# Patient Record
Sex: MALE | Race: WHITE | Employment: UNEMPLOYED | URBAN - METROPOLITAN AREA
[De-identification: names, ages, dates, MRNs, and addresses within clinical notes are randomized per-mention and may not be internally consistent; named-entity substitution may affect disease eponyms.]

---

## 2017-10-08 ENCOUNTER — HOSPITAL ENCOUNTER (EMERGENCY)
Facility: CLINIC | Age: 37
Discharge: HOME | End: 2017-10-08
Attending: EMERGENCY MEDICINE | Admitting: EMERGENCY MEDICINE
Payer: OTHER MISCELLANEOUS

## 2017-10-08 ENCOUNTER — APPOINTMENT (OUTPATIENT)
Dept: MRI IMAGING | Facility: CLINIC | Age: 37
End: 2017-10-08
Attending: EMERGENCY MEDICINE
Payer: OTHER MISCELLANEOUS

## 2017-10-08 VITALS
TEMPERATURE: 98 F | HEART RATE: 71 BPM | DIASTOLIC BLOOD PRESSURE: 90 MMHG | SYSTOLIC BLOOD PRESSURE: 130 MMHG | OXYGEN SATURATION: 95 % | RESPIRATION RATE: 16 BRPM

## 2017-10-08 DIAGNOSIS — M54.50 ACUTE BILATERAL LOW BACK PAIN WITHOUT SCIATICA: ICD-10-CM

## 2017-10-08 PROCEDURE — 72148 MRI LUMBAR SPINE W/O DYE: CPT

## 2017-10-08 PROCEDURE — 99285 EMERGENCY DEPT VISIT HI MDM: CPT | Mod: 25

## 2017-10-08 PROCEDURE — 25000128 H RX IP 250 OP 636: Performed by: EMERGENCY MEDICINE

## 2017-10-08 PROCEDURE — 96374 THER/PROPH/DIAG INJ IV PUSH: CPT

## 2017-10-08 PROCEDURE — 25000132 ZZH RX MED GY IP 250 OP 250 PS 637: Performed by: EMERGENCY MEDICINE

## 2017-10-08 RX ORDER — HYDROMORPHONE HYDROCHLORIDE 2 MG/1
2 TABLET ORAL EVERY 4 HOURS PRN
Qty: 18 TABLET | Refills: 0 | Status: SHIPPED | OUTPATIENT
Start: 2017-10-08 | End: 2017-10-12

## 2017-10-08 RX ORDER — HYDROMORPHONE HYDROCHLORIDE 1 MG/ML
0.5 INJECTION, SOLUTION INTRAMUSCULAR; INTRAVENOUS; SUBCUTANEOUS
Status: DISCONTINUED | OUTPATIENT
Start: 2017-10-08 | End: 2017-10-08 | Stop reason: HOSPADM

## 2017-10-08 RX ORDER — CYCLOBENZAPRINE HCL 10 MG
10 TABLET ORAL ONCE
Status: COMPLETED | OUTPATIENT
Start: 2017-10-08 | End: 2017-10-08

## 2017-10-08 RX ORDER — ONDANSETRON 2 MG/ML
4 INJECTION INTRAMUSCULAR; INTRAVENOUS EVERY 30 MIN PRN
Status: DISCONTINUED | OUTPATIENT
Start: 2017-10-08 | End: 2017-10-08 | Stop reason: HOSPADM

## 2017-10-08 RX ADMIN — CYCLOBENZAPRINE HYDROCHLORIDE 10 MG: 10 TABLET, FILM COATED ORAL at 16:35

## 2017-10-08 RX ADMIN — HYDROMORPHONE HYDROCHLORIDE 0.5 MG: 1 INJECTION, SOLUTION INTRAMUSCULAR; INTRAVENOUS; SUBCUTANEOUS at 16:35

## 2017-10-08 ASSESSMENT — ENCOUNTER SYMPTOMS
CHILLS: 0
ABDOMINAL PAIN: 0
DIAPHORESIS: 0
COLOR CHANGE: 1
VOMITING: 0
NECK PAIN: 0
NAUSEA: 0
WOUND: 0
DIARRHEA: 0
NUMBNESS: 1
MYALGIAS: 1
HEADACHES: 0
WEAKNESS: 1
FEVER: 0
BACK PAIN: 1

## 2017-10-08 NOTE — ED PROVIDER NOTES
History     Chief Complaint:  Back pain    HPI   Reid Carter is a 37 year old male who presents with his wife to the ED for evaluation of back pain. The patient reports that he was lifting a large piece of rubber while at work 6 days ago when it felt like there was a tear in his low back. He reports it being painful through the day but that it slowly started feeling better through the week up until 2 days ago. Waking up 2 mornings ago, he rolled over and experienced intense spasms of pain in his low back. He was unable to get out of bed for about 4 hours. Once getting out of bed, he went to urgent care where they ended up sending him to the ED where he was given a shot of Toradol, a prescription for pain medications, and recommended that he follows up with a specialist on Monday. Since 2 days ago, he also noticed a slight burn and tingle in his left leg radiating from his hip down to his knee. He is unable to sit down or stand up easily, noting it to be about a 45 minute process. This morning, his wife noticed some new bruising in the divot of his low back and thus wanted him reevaluated due to concern for something more serious going on. Here in the ED he denies any other symptoms such as fever, abdominal pain, urinary problems, headache, neck pain, gait problem, or any other symptoms.    Allergies:  No known drug allergies    Medications:    Oxycodone  Diazepam  Ibuprofen    Past Medical History:    Sciatica  Bulged disc in back    Past Surgical History:    History reviewed. No pertinent surgical history.    Family History:    History reviewed. No pertinent family history.     Social History:  Marital Status:      Review of Systems   Constitutional: Negative for chills, diaphoresis and fever.   Gastrointestinal: Negative for abdominal pain, diarrhea, nausea and vomiting.   Genitourinary: Negative.    Musculoskeletal: Positive for back pain and myalgias. Negative for neck pain.   Skin: Positive for color  change (bruising to low back). Negative for rash and wound.   Neurological: Positive for weakness and numbness. Negative for headaches.   All other systems reviewed and are negative.    Physical Exam   Patient Vitals for the past 24 hrs:   BP Temp Temp src Pulse Resp SpO2   10/08/17 1856 - - - 71 16 -   10/08/17 1852 - - - - - 95 %   10/08/17 1850 130/90 - - - - -   10/08/17 1700 123/90 - - - - 94 %   10/08/17 1645 128/85 - - - - 97 %   10/08/17 1508 136/86 98  F (36.7  C) Oral 91 20 99 %     Physical Exam   Constitutional:  Appears well-developed and well-nourished. Alert. Conversant. Non toxic. Uncomfortable and standing next to his bed. Says he has too much pain to climb into bed. Wife is attentively at his side  HENT:   Head: Atraumatic.   Nose: Nose normal.  Mouth/Throat: Oral mucosa is clear and moist. no trismus. Pharynx normal. Tonsils symmetric. No tonsillar enlargement, erythema, or exudate.  Eyes: Conjunctivae normal. EOM normal. Pupils equal, round, and reactive to light. No scleral icterus.   Neck: Normal range of motion. Neck supple. No tracheal deviation present.   Cardiovascular: Normal rate, regular rhythm. No gallop. No friction rub. No murmur heard. Symmetric radial artery pulses   Pulmonary/Chest: Effort normal. No stridor. No respiratory distress. No wheezes. No rales. No rhonchi . No tenderness.   Abdominal: Soft. Bowel sounds normal. No distension. No mass. No tenderness. No rebound. No guarding.   Musculoskeletal: Normal range of motion. No edema. Significant tenderness over the left lumbar paraspinous muscles and left SI joint. No point tenderness over the midline. No deformity . His wife feels like the left side of his low back is swollen and has a bruise. There is a small 1 cm area of darkened skin not really consistent with ecchymosis . No erythema, vesicles, warmth.   No tenderness of the pelvis, hip, femur, knee, leg, ankle foot. No other musculoskeletal injury or pain.     Lymph: No  cervical adenopathy.   Neurological: Alert and oriented to person, place, and time. Normal strength. CN II-VII intact. No sensory deficit. GCS eye subscore is 4. GCS verbal subscore is 5. GCS motor subscore is 6. Normal coordination   Sensory: Normal light touch sensation bilaterally on the anteromedial thigh (L3), medial malleolus (L4), dorsal first web space (L5), lateral malleolus (S1).  Strength: 5/5 strength bilaterally in the hip flexors (L3), quadriceps (L4), tibialis anterior, EHL (L5), gastrocnemius (S1), and hamstring.  DTRs: symmetric in the patella (2/4) and in the achilles tendons.  Negative straight leg raise bilaterally. However lifting either leg beyond about 20  causes significant back spasm and wincing in pain . Babinski's reflexes are down going bilaterally  Skin: Skin is warm and dry. No rash noted. No pallor. Normal capillary refill.  Psychiatric:  Normal mood. Normal affect.  seems a bit angry and standoffish, unclear if it's due to pain    Emergency Department Course     Imaging:  Radiographic findings were communicated with the patient and family who voiced understanding of the findings.    MRI Lumbar Spine w/o contrast:  IMPRESSION:   1. Multilevel degenerative change  2. The most significant findings appear to be at L5-S1 where there is  a small broad-based central and right central disc herniation. This  extends up to both the right and left S1 nerve roots but on these  images, it does not appear to be causing any significant displacement  of the S1 nerve roots.  Results per radiology.    Interventions:  1635: Dilaudid 0.5 mg IV  1635: Flexeril 10 mg PO    Emergency Department Course:  Past medical records, nursing notes, and vitals reviewed.  1559: I performed an exam of the patient and obtained history, as documented above. GCS 15.    The patient was placed on continuous cardiac monitoring and pulse oximetry.    The patient was sent for a MRI while in the emergency department, findings  above.    1650: I rechecked the patient. Explained findings to patient and spouse.    1847: I rechecked the patient. Findings and plan explained to the Patient and spouse. Patient discharged home with instructions regarding supportive care, medications, and reasons to return. The importance of close follow-up was reviewed.     Impression & Plan      Medical Decision Making:  Reid Carter is a 37 year old male who presented with back pain. It's associated with some pain and numbness rating down his left thigh but no symptoms below his left knee. He was Mc seen in an emergency room in [place] and given Percocet, Valium, Motrin which are not helping his symptoms. Since then he's had new development of his left leg symptoms. The patient did not sustain any trauma, therefore x-rays are not necessary due to the low likelihood of fracture or subluxation. The patient has not had a fever, saddle/perineal anesthesia, bilateral foot numbness, or bowel or bladder dysfunction. Given his evolving left leg symptoms, with some report of numbness and weakness in the left leg; as well as the severe intractability of his pain - unable even to sit down in bed without assistance - I felt that MR imaging was indicated. MRI does show multilevel degenerative change and a small broad-based central and right disc herniation but no definite nerve root impingement. His symptoms are all located in the back, left leg. There is no clinical or MRI evidence of cauda equina syndrome, discitis, spinal/epidural space hematoma or epidural abscess. Symptoms seem consistent with a musculoskeletal issues and significant muscle spasm. Pain has improved with interventions in the emergency department. The patient will be discharged with pain medications to use as directed. Ice or heat to the back and stretching exercises. No heavy lifting, bending or twisting. Return if increasing pain, numbness, weakness, or bowel or bladder dysfunction. The patient was  advised to schedule follow-up with the spine clinic within 3 days to re-assess symptoms. They actually live in [place], but are staying at a investment property in [place] really want to follow up with the doctor here in the [facility].    Critical Care time:  none    Diagnosis:    ICD-10-CM   1. Acute bilateral low back pain without sciatica M54.5       Disposition:  discharged to home    Discharge Medications:      Details   tiZANidine (ZANAFLEX) 4 MG tablet Take 1 tablet (4 mg) by mouth 3 times daily as needed for muscle spasms (MUSCLE SPASM), Disp-20 tablet, R-0, Local Print      HYDROmorphone (DILAUDID) 2 MG tablet Take 1 tablet (2 mg) by mouth every 4 hours as needed for pain, Disp-18 tablet, R-0, Local Print            Carolin Purcell  10/8/2017   [facility] EMERGENCY DEPARTMENT  I, Carolin Purcell, am serving as a scribe at 3:59 PM on 10/8/2017 to document services personally performed by Bernabe Williamson MD based on my observations and the provider's statements to me.        Bernabe Williamson MD  10/09/17 0010

## 2017-10-08 NOTE — LETTER
To Whom it may concern:      Reid Carter was seen in our Emergency Department today, 10/08/17.  I expect his condition to improve over the next 3-5 days.  He may return to work/school when improved.    Sincerely,        Bernabe Williamson MD

## 2017-10-08 NOTE — ED NOTES
Lower back pain and left leg weakness and numbness since injured back on Tuesday. ABC intact alert and no distress. [place] and given pain pills but they are not helping.

## 2017-10-08 NOTE — ED AVS SNAPSHOT
[facility] Emergency Department    [address] [place] [address]    Phone:  [phone]    Fax:  [phone]                                       Reid Carter   MRN: [id number]    Department:  [facility] Emergency Department   Date of Visit:  10/8/2017           Patient Information     Date Of Birth          1980        Your diagnoses for this visit were:     Acute bilateral low back pain without sciatica        You were seen by Bernabe Williamson MD.      Follow-up Information     Follow up with Xiang Lora MD In 1 day.    Specialty:  Neurosurgery    Contact information:    [facility]  [address]  [place] [address]  [phone]          Discharge Instructions         Discharge Instructions  Back Pain  You were seen today for back pain. Back pain can have many causes, but most will get better without surgery or other specific treatment. Sometimes there is a herniated ( slipped ) disc. We don t usually do MRI scans to look for these right away, since most herniated discs will get better on their own with time.  Today, we did not find any evidence that your back pain was caused by a serious condition, such as an infection, fracture, or tumor. However, sometimes symptoms develop over time and cannot be found during an emergency visit, so it is very important that you follow up with your primary doctor.  Return to the Emergency Department if:    You develop a fever with your back pain.     You have weakness or change in sensation in one or both legs.    You lose control of your bowels or bladder, or can t empty your bladder.    Your pain gets much worse.     Follow-up with your doctor:    Unless your pain has completely gone away, please make an appointment with your doctor within one week.  You may need further management of your back pain, such as more pain medication, imaging such as an X-ray or MRI, or physical  therapy.    What can I do to help myself?    Remain Active -- People are often afraid that they will hurt their back further or delay recovery by remaining active, but this is one of the best things you can do for your back. In fact, prolonged bed rest is not recommended. Studies have shown that people with low back pain recover faster when they remain active. Movement helps to bring blood flow to the muscles and relieve muscle spasms as well as preventing loss of muscle strength.    Heat -- Using a heating pad can help with low back pain during the first few weeks. Do not sleep with a heating pad, as you can be burned.     Pain medications - You may take a pain medication such as Tylenol  (acetaminophen), Advil , Nuprin  (ibuprofen) or Aleve  (naproxen).  If you have been given a narcotic such as Vicodin  (hydrocodone with acetaminophen), Percocet  (oxycodone with acetaminophen), codeine, or a muscle relaxant such as Flexeril  (cyclobenzaprine) or Soma  (carisoprodol), do not drive for four hours after you have taken it. If the narcotic contains Tylenol  (acetaminophen), do not take Tylenol  with it. All narcotics will cause constipation, so eat a high fiber diet.   If you were given a prescription for medicine here today, be sure to read all of the information (including the package insert) that comes with your prescription.  This will include important information about the medicine, its side effects, and any warnings that you need to know about.  The pharmacist who fills the prescription can provide more information and answer questions you may have about the medicine.  If you have questions or concerns that the pharmacist cannot address, please call or return to the Emergency Department.   Opioid Medication Information    Pain medications are among the most commonly prescribed medicines, so we are including this information for all our patients. If you did not receive pain medication or get a prescription for  pain medicine, you can ignore it.     You may have been given a prescription for an opioid (narcotic) pain medicine and/or have received a pain medicine while here in the Emergency Department. These medicines can make you drowsy or impaired. You must not drive, operate dangerous equipment, or engage in any other dangerous activities while taking these medications. If you drive while taking these medications, you could be arrested for DUI, or driving under the influence. Do not drink any alcohol while you are taking these medications.     Opioid pain medications can cause addiction. If you have a history of chemical dependency of any type, you are at a higher risk of becoming addicted to pain medications.  Only take these prescribed medications to treat your pain when all other options have been tried. Take it for as short a time and as few doses as possible. Store your pain pills in a secure place, as they are frequently stolen and provide a dangerous opportunity for children or visitors in your house to start abusing these powerful medications. We will not replace any lost or stolen medicine.  As soon as your pain is better, you should flush all your remaining medication.     Many prescription pain medications contain Tylenol  (acetaminophen), including Vicodin , Tylenol #3 , Norco , Lortab , and Percocet .  You should not take any extra pills of Tylenol  if you are using these prescription medications or you can get very sick.  Do not ever take more than 3000 mg of acetaminophen in any 24 hour period.    All opioids tend to cause constipation. Drink plenty of water and eat foods that have a lot of fiber, such as fruits, vegetables, prune juice, apple juice and high fiber cereal.  Take a laxative if you don t move your bowels at least every other day. Miralax , Milk of Magnesia, Colace , or Senna  can be used to keep you regular.      Remember that you can always come back to the Emergency Department if you are not  able to see your regular doctor in the amount of time listed above, if you get any new symptoms, or if there is anything that worries you.        24 Hour Appointment Hotline       To make an appointment at any [facility], call [phone] ([phone]). If you don't have a family doctor or clinic, we will help you find one. [facility] are conveniently located to serve the needs of you and your family.             Review of your medicines      START taking        Dose / Directions Last dose taken    HYDROmorphone 2 MG tablet   Commonly known as:  DILAUDID   Dose:  2 mg   Quantity:  18 tablet        Take 1 tablet (2 mg) by mouth every 4 hours as needed for pain   Refills:  0        tiZANidine 4 MG tablet   Commonly known as:  ZANAFLEX   Dose:  4 mg   Quantity:  20 tablet        Take 1 tablet (4 mg) by mouth 3 times daily as needed for muscle spasms (MUSCLE SPASM)   Refills:  0          Our records show that you are taking the medicines listed below. If these are incorrect, please call your family doctor or clinic.        Dose / Directions Last dose taken    DIAZEPAM PO        Refills:  0        IBUPROFEN PO        Refills:  0        OXYCODONE HCL PO        Refills:  0                Prescriptions were sent or printed at these locations (2 Prescriptions)                   Other Prescriptions                Printed at Department/Unit printer (2 of 2)         tiZANidine (ZANAFLEX) 4 MG tablet               HYDROmorphone (DILAUDID) 2 MG tablet                Procedures and tests performed during your visit     Lumbar spine MRI w/o contrast      Orders Needing Specimen Collection     None      Pending Results     No orders found from 10/6/2017 to 10/9/2017.            Pending Culture Results     No orders found from 10/6/2017 to 10/9/2017.            Pending Results Instructions     If you had any lab results that were not finalized at the time of your Discharge, you can call the ED Lab Result RN at  [phone]. You will be contacted by this team for any positive Lab results or changes in treatment. The nurses are available 7 days a week from 10A to 6:30P.  You can leave a message 24 hours per day and they will return your call.        Test Results From Your Hospital Stay        10/8/2017  6:07 PM      Narrative     MR LUMBAR SPINE W/O [id number]/8/2017 5:32 PM      HISTORY: low back pain, left leg numnness, weakness, pain    TECHNIQUE:  Multiplanar, multisequence MRI of the lumbar spine without  contrast.     COMPARISON: None.    FINDINGS:This report assumes five lumbar type vertebrae.     The conus and visualized portions of the thoracic spinal cord appear  normal. The paraspinal soft tissues appear normal as visualized. The  bone marrow has normal signal intensity.    L1-L2:   Normal disc, facet joints, spinal canal and neural foramina.    L2-L3:  Dehydration of the disc. Mild symmetric annular disc bulge.  Mild bilateral foraminal stenosis due to the bulging disc.    L3-L4:  Dehydration of the disc. Mild symmetric annular disc bulge.  Central canal lower limits of normal. Mild bilateral foraminal  stenosis due to the bulging disc.    L4-L5:  Dehydration and degeneration of the disc. Mild symmetric  annular disc bulge. Mild bilateral foraminal stenosis due to the  bulging disc.    L5-S1:  Dehydration and degeneration of the disc. Mild diffuse annular  disc bulge. Right central annular fissure with a small central and  right central disc protrusion/herniation causing mild mass effect on  the dural sac. This extends up to the right S1 nerve root but it is  not causing any significant displacement of the right S1 nerve root.  The disc protrusion extends slightly to the left of midline and  extends up to the left S1 nerve root but no nerve root displacement is  seen.. Mild right and left foraminal stenosis due to the bulging disc.        Impression     IMPRESSION:   1. Multilevel degenerative change  2. The  most significant findings appear to be at L5-S1 where there is  a small broad-based central and right central disc herniation. This  extends up to both the right and left S1 nerve roots but on these  images, it does not appear to be causing any significant displacement  of the S1 nerve roots.    CECY LI MD                Clinical Quality Measure: Blood Pressure Screening     Your blood pressure was checked while you were in the emergency department today. The last reading we obtained was  BP: 123/90 . Please read the guidelines below about what these numbers mean and what you should do about them.  If your systolic blood pressure (the top number) is less than 120 and your diastolic blood pressure (the bottom number) is less than 80, then your blood pressure is normal. There is nothing more that you need to do about it.  If your systolic blood pressure (the top number) is 120-139 or your diastolic blood pressure (the bottom number) is 80-89, your blood pressure may be higher than it should be. You should have your blood pressure rechecked within a year by a primary care provider.  If your systolic blood pressure (the top number) is 140 or greater or your diastolic blood pressure (the bottom number) is 90 or greater, you may have high blood pressure. High blood pressure is treatable, but if left untreated over time it can put you at risk for heart attack, stroke, or kidney failure. You should have your blood pressure rechecked by a primary care provider within the next 4 weeks.  If your provider in the emergency department today gave you specific instructions to follow-up with your doctor or provider even sooner than that, you should follow that instruction and not wait for up to 4 weeks for your follow-up visit.        Thank you for choosing [facility]       Thank you for choosing [facility] for your care. Our goal is always to provide you with excellent care. Hearing back from our patients is one way we can continue  "to improve our services. Please take a few minutes to complete the written survey that you may receive in the mail after you visit with us. Thank you!        [facility] Information     [facility] lets you send messages to your doctor, view your test results, renew your prescriptions, schedule appointments and more. To sign up, go to [email or web address] . Click on \"Log in\" on the left side of the screen, which will take you to the Welcome page. Then click on \"Sign up Now\" on the right side of the page.     You will be asked to enter the access code listed below, as well as some personal information. Please follow the directions to create your username and password.     Your access code is: [email or web address]  Expires: 2018  6:48 PM     Your access code will  in 90 days. If you need help or a new code, please call your [facility] clinic or [phone].        Care EveryWhere ID     This is your Care EveryWhere ID. This could be used by other organizations to access your [facility] medical records  [id number]        Equal Access to Services     [facility] HospitalJULIAN : Hadpam Torres, wabalbina doran, qamehdi vines, yelena gregorio. So [facility] [phone].    ATENCIÓN: Si habla español, tiene a londono disposición servicios gratuitos de asistencia lingüística. Mabel al [phone].    We comply with applicable federal civil rights laws and [place] laws. We do not discriminate on the basis of race, color, national origin, age, disability, sex, sexual orientation, or gender identity.            After Visit Summary       This is your record. Keep this with you and show to your community pharmacist(s) and doctor(s) at your next visit.                  "

## 2017-10-08 NOTE — ED AVS SNAPSHOT
[facility]    [address]    [facility] [address]    Phone:  [phone]    Fax:  [phone]                                       Reid Carter   MRN: [id number]    Department:  [facility]   Date of Visit:  10/8/2017           After Visit Summary Signature Page     I have received my discharge instructions, and my questions have been answered. I have discussed any challenges I see with this plan with the nurse or doctor.    ..........................................................................................................................................  Patient/Patient Representative Signature      ..........................................................................................................................................  Patient Representative Print Name and Relationship to Patient    ..................................................               ................................................  Date                                            Time    ..........................................................................................................................................  Reviewed by Signature/Title    ...................................................              ..............................................  Date                                                            Time

## 2017-10-12 ENCOUNTER — OFFICE VISIT (OUTPATIENT)
Dept: FAMILY MEDICINE | Facility: CLINIC | Age: 37
End: 2017-10-12
Payer: COMMERCIAL

## 2017-10-12 VITALS
WEIGHT: 181 LBS | SYSTOLIC BLOOD PRESSURE: 127 MMHG | HEART RATE: 96 BPM | TEMPERATURE: 97.6 F | DIASTOLIC BLOOD PRESSURE: 86 MMHG

## 2017-10-12 DIAGNOSIS — M54.42 ACUTE BILATERAL LOW BACK PAIN WITH LEFT-SIDED SCIATICA: Primary | ICD-10-CM

## 2017-10-12 PROCEDURE — 99203 OFFICE O/P NEW LOW 30 MIN: CPT | Performed by: PHYSICIAN ASSISTANT

## 2017-10-12 RX ORDER — HYDROMORPHONE HYDROCHLORIDE 2 MG/1
2 TABLET ORAL EVERY 6 HOURS PRN
Qty: 20 TABLET | Refills: 0 | Status: SHIPPED | OUTPATIENT
Start: 2017-10-12 | End: 2017-12-14

## 2017-10-12 NOTE — PROGRESS NOTES
SUBJECTIVE:   Reid Carter is a 37 year old male who presents to clinic today for the following health issues:      ED/UC Followup:    Facility:  [facility]  Date of visit: 10/8/2017  Reason for visit: back pain  Current Status: no improvement since ER visit-MRI completed-patient was referred to specialists-patient followed with chiropractor daily since ER visit who referred patient to get injections at [facility]-has not been able to due to work comp. Is suppose to hear back from them today at 3 PM;  was also seen 10/6/217 ER [facility] in [facility]-was given rx for diazepam, oxycodone and ibuprofen. No improvement. Dilaudid and tizanidine helps.        Problem list and histories reviewed & adjusted, as indicated.  Additional history: as documented    Patient Active Problem List   Diagnosis     Degeneration of lumbar or lumbosacral intervertebral disc     History reviewed. No pertinent surgical history.    Social History   Substance Use Topics     Smoking status: Current Every Day Smoker     Smokeless tobacco: Never Used     Alcohol use Not on file     History reviewed. No pertinent family history.      Current Outpatient Prescriptions   Medication Sig Dispense Refill     HYDROmorphone (DILAUDID) 2 MG tablet Take 1 tablet (2 mg) by mouth every 6 hours as needed for pain 20 tablet 0     tiZANidine (ZANAFLEX) 4 MG tablet Take 1-2 tablets (4-8 mg) by mouth 3 times daily as needed for muscle spasms (MUSCLE SPASM) 60 tablet 0     No Known Allergies  BP Readings from Last 3 Encounters:   10/12/17 127/86   10/08/17 130/90    Wt Readings from Last 3 Encounters:   10/12/17 181 lb (82.1 kg)                        Reviewed and updated as needed this visit by clinical staffTobacco  Allergies  Meds  Problems  Med Hx  Surg Hx  Fam Hx  Soc Hx        Reviewed and updated as needed this visit by Provider  Allergies  Meds  Problems         ROS:  Constitutional, msk, neuro, psych, cardiovascular, pulmonary, gi and gu systems  are negative, except as otherwise noted.      OBJECTIVE:   /86 (BP Location: Right arm, Patient Position: Chair, Cuff Size: Adult Large)  Pulse 96  Temp 97.6  F (36.4  C) (Oral)  Wt 181 lb (82.1 kg)  There is no height or weight on file to calculate BMI.  GENERAL: alert, moderate distress and in pain  Comprehensive back pain exam:  Tenderness of L3-S1 and surrounding musculature. left sciatic notch, Pain limits the following motions: difuse due to pain. patient standing with walker assistants, Able to heel and toe walk, reflexes unable to be performed due to standing, Lower extremity sensation normal and equal on both sides and SLR unable to be performed at exam  PSYCH: mentation appears normal, affect normal/bright    Diagnostic Test Results:  None-MRI from ER personally reviewed.     ASSESSMENT/PLAN:     (M54.42) Acute bilateral low back pain with left-sided sciatica  (primary encounter diagnosis)  Comment: patient is in obvious pain today. Appears has plan for possible injections, however, also recommending ortho spine evaluation as well given level of pain. PO steroid was considered, but if getting injection in the next 3 weeks, this is not advisable. Will refill pain medications today given pain to allow to maintain follow up with specialist. pdmp reviewed. No concerns of abuse.   Plan: ORTHO  REFERRAL, HYDROmorphone         (DILAUDID) 2 MG tablet, tiZANidine (ZANAFLEX) 4        MG tablet        -Medication use and side effects discussed with the patient. Patient is in complete understanding and agreement with plan.         Follow up: per specialists     Roberto Pearl PA-C  [facility]

## 2017-10-12 NOTE — MR AVS SNAPSHOT
After Visit Summary   10/12/2017    Reid Carter    MRN: [id number]           Patient Information     Date Of Birth          1980        Visit Information        Provider Department      10/12/2017 1:30 PM Roberto Pearl PA-C [facility]        Today's Diagnoses     Acute bilateral low back pain with left-sided sciatica    -  1       Follow-ups after your visit        Additional Services     ORTHO  REFERRAL       [facility] is referring you to the Orthopedic  Services at [facility].       The  Representative will assist you in the coordination of your Orthopedic and Musculoskeletal Care as prescribed by your physician.    The  Representative will call you within 1 business day to help schedule your appointment, or you may contact the  Representative at:    All areas ~ [phone]     Type of Referral : Spine: Lumbar  **Choose Medical Spine Specialist (unless patient was seen by a Medical Spine Specialist within the past 6 months).**  Surgical Evaluation is advised if the patient presents with one or more of the following red flags: Evidence of Spinal Tumor, Infection or Fracture, Cauda Equina Syndrome, Sudden or Progressive Weakness, Loss of Bowel or Bladder Control, or any other documented emergent neurological condition resulting from a Lumbar Spinal Condition. Spine Surgeon        Timeframe requested: Routine    Coverage of these services is subject to the terms and limitations of your health insurance plan.  Please call member services at your health plan with any benefit or coverage questions.      If X-rays, CT or MRI's have been performed, please contact the facility where they were done to arrange for , prior to your scheduled appointment.  Please bring this referral request to your appointment and present it to your specialist.                  Who to contact     If you  "have questions or need follow up information about today's clinic visit or your schedule please contact [facility] directly at [phone].  Normal or non-critical lab and imaging results will be communicated to you by MyChart, letter or phone within 4 business days after the clinic has received the results. If you do not hear from us within 7 days, please contact the clinic through OYE!hart or phone. If you have a critical or abnormal lab result, we will notify you by phone as soon as possible.  Submit refill requests through SPORTLOGiQ or call your pharmacy and they will forward the refill request to us. Please allow 3 business days for your refill to be completed.          Additional Information About Your Visit        OYE!harBriefcase Information     SPORTLOGiQ lets you send messages to your doctor, view your test results, renew your prescriptions, schedule appointments and more. To sign up, go to [email or web address] . Click on \"Log in\" on the left side of the screen, which will take you to the Welcome page. Then click on \"Sign up Now\" on the right side of the page.     You will be asked to enter the access code listed below, as well as some personal information. Please follow the directions to create your username and password.     Your access code is: [email or web address]  Expires: 2018  6:48 PM     Your access code will  in 90 days. If you need help or a new code, please call your [place] clinic or [phone].        Care EveryWhere ID     This is your Care EveryWhere ID. This could be used by other organizations to access your [place] medical records  [id number]        Your Vitals Were     Pulse Temperature                96 97.6  F (36.4  C) (Oral)           Blood Pressure from Last 3 Encounters:   10/12/17 127/86   10/08/17 130/90    Weight from Last 3 Encounters:   10/12/17 181 lb (82.1 kg)              We Performed the Following     ORTHO  REFERRAL          Today's Medication " Changes          These changes are accurate as of: 10/12/17  2:27 PM.  If you have any questions, ask your nurse or doctor.               These medicines have changed or have updated prescriptions.        Dose/Directions    HYDROmorphone 2 MG tablet   Commonly known as:  DILAUDID   This may have changed:  when to take this   Used for:  Acute bilateral low back pain with left-sided sciatica   Changed by:  Roberto Pearl PA-C        Dose:  2 mg   Take 1 tablet (2 mg) by mouth every 6 hours as needed for pain   Quantity:  20 tablet   Refills:  0       tiZANidine 4 MG tablet   Commonly known as:  ZANAFLEX   This may have changed:  how much to take   Used for:  Acute bilateral low back pain with left-sided sciatica   Changed by:  Roberto Pearl PA-C        Dose:  4-8 mg   Take 1-2 tablets (4-8 mg) by mouth 3 times daily as needed for muscle spasms (MUSCLE SPASM)   Quantity:  60 tablet   Refills:  0         Stop taking these medicines if you haven't already. Please contact your care team if you have questions.     DIAZEPAM PO   Stopped by:  Roberto Pearl PA-C           IBUPROFEN PO   Stopped by:  Roberto Pearl PA-C           OXYCODONE HCL PO   Stopped by:  Roberto Pearl PA-C                Where to get your medicines      These medications were sent to [facility] [address] [address]     Phone:  [phone]     tiZANidine 4 MG tablet         Some of these will need a paper prescription and others can be bought over the counter.  Ask your nurse if you have questions.     Bring a paper prescription for each of these medications     HYDROmorphone 2 MG tablet                Primary Care Provider Office Phone # Fax #    Roberto Pearl PA-C [phone] [address] [facility] [address]        Equal Access to Services     DULCE MARIA HENRIQUEZ AH: jon Bernard  anat doransukhjinder kierayelena andres ah. So [facility] [phone].    ATENCIÓN: Si manish soctt, tiene a londono disposición servicios gratuitos de asistencia lingüística. Beatrizame al [phone].    We comply with applicable federal civil rights laws and [place] laws. We do not discriminate on the basis of race, color, national origin, age, disability, sex, sexual orientation, or gender identity.            Thank you!     Thank you for choosing [facility]  for your care. Our goal is always to provide you with excellent care. Hearing back from our patients is one way we can continue to improve our services. Please take a few minutes to complete the written survey that you may receive in the mail after your visit with us. Thank you!             Your Updated Medication List - Protect others around you: Learn how to safely use, store and throw away your medicines at www.disposemymeds.org.          This list is accurate as of: 10/12/17  2:27 PM.  Always use your most recent med list.                   Brand Name Dispense Instructions for use Diagnosis    HYDROmorphone 2 MG tablet    DILAUDID    20 tablet    Take 1 tablet (2 mg) by mouth every 6 hours as needed for pain    Acute bilateral low back pain with left-sided sciatica       tiZANidine 4 MG tablet    ZANAFLEX    60 tablet    Take 1-2 tablets (4-8 mg) by mouth 3 times daily as needed for muscle spasms (MUSCLE SPASM)    Acute bilateral low back pain with left-sided sciatica

## 2017-10-12 NOTE — NURSING NOTE
Chief Complaint   Patient presents with     New Patient     ER F/U       Initial /86 (BP Location: Right arm, Patient Position: Chair, Cuff Size: Adult Large)  Pulse 96  Temp 97.6  F (36.4  C) (Oral)  Wt 181 lb (82.1 kg) There is no height or weight on file to calculate BMI.  Medication Reconciliation: complete      HEMA Dumont

## 2017-12-13 ENCOUNTER — OFFICE VISIT (OUTPATIENT)
Dept: FAMILY MEDICINE | Facility: CLINIC | Age: 37
End: 2017-12-13
Payer: COMMERCIAL

## 2017-12-13 VITALS
WEIGHT: 178 LBS | HEART RATE: 71 BPM | HEIGHT: 71 IN | BODY MASS INDEX: 24.92 KG/M2 | TEMPERATURE: 97.6 F | SYSTOLIC BLOOD PRESSURE: 120 MMHG | DIASTOLIC BLOOD PRESSURE: 74 MMHG

## 2017-12-13 DIAGNOSIS — F17.200 TOBACCO USE DISORDER: ICD-10-CM

## 2017-12-13 DIAGNOSIS — R59.0 POSTERIOR AURICULAR LYMPHADENOPATHY: Primary | ICD-10-CM

## 2017-12-13 DIAGNOSIS — R59.0 LAD (LYMPHADENOPATHY), ANTERIOR CERVICAL: ICD-10-CM

## 2017-12-13 LAB
BASOPHILS # BLD AUTO: 0.1 10E9/L (ref 0–0.2)
BASOPHILS NFR BLD AUTO: 0.6 %
CRP SERPL-MCNC: <2.9 MG/L (ref 0–8)
DIFFERENTIAL METHOD BLD: NORMAL
EOSINOPHIL # BLD AUTO: 0.1 10E9/L (ref 0–0.7)
EOSINOPHIL NFR BLD AUTO: 0.9 %
ERYTHROCYTE [DISTWIDTH] IN BLOOD BY AUTOMATED COUNT: 11.8 % (ref 10–15)
ERYTHROCYTE [SEDIMENTATION RATE] IN BLOOD BY WESTERGREN METHOD: 7 MM/H (ref 0–15)
HCT VFR BLD AUTO: 44.7 % (ref 40–53)
HGB BLD-MCNC: 15.7 G/DL (ref 13.3–17.7)
LYMPHOCYTES # BLD AUTO: 1.8 10E9/L (ref 0.8–5.3)
LYMPHOCYTES NFR BLD AUTO: 23 %
MCH RBC QN AUTO: 33 PG (ref 26.5–33)
MCHC RBC AUTO-ENTMCNC: 35.1 G/DL (ref 31.5–36.5)
MCV RBC AUTO: 94 FL (ref 78–100)
MONOCYTES # BLD AUTO: 0.4 10E9/L (ref 0–1.3)
MONOCYTES NFR BLD AUTO: 5.3 %
NEUTROPHILS # BLD AUTO: 5.5 10E9/L (ref 1.6–8.3)
NEUTROPHILS NFR BLD AUTO: 70.2 %
PLATELET # BLD AUTO: 258 10E9/L (ref 150–450)
RBC # BLD AUTO: 4.76 10E12/L (ref 4.4–5.9)
WBC # BLD AUTO: 7.9 10E9/L (ref 4–11)

## 2017-12-13 PROCEDURE — 99214 OFFICE O/P EST MOD 30 MIN: CPT | Performed by: FAMILY MEDICINE

## 2017-12-13 PROCEDURE — 80053 COMPREHEN METABOLIC PANEL: CPT | Performed by: FAMILY MEDICINE

## 2017-12-13 PROCEDURE — 36415 COLL VENOUS BLD VENIPUNCTURE: CPT | Performed by: FAMILY MEDICINE

## 2017-12-13 PROCEDURE — 85652 RBC SED RATE AUTOMATED: CPT | Performed by: FAMILY MEDICINE

## 2017-12-13 PROCEDURE — 87389 HIV-1 AG W/HIV-1&-2 AB AG IA: CPT | Performed by: FAMILY MEDICINE

## 2017-12-13 PROCEDURE — 86140 C-REACTIVE PROTEIN: CPT | Performed by: FAMILY MEDICINE

## 2017-12-13 PROCEDURE — 85025 COMPLETE CBC W/AUTO DIFF WBC: CPT | Performed by: FAMILY MEDICINE

## 2017-12-13 NOTE — MR AVS SNAPSHOT
After Visit Summary   12/13/2017    Reid Carter    MRN: [id number]           Patient Information     Date Of Birth          1980        Visit Information        Provider Department      12/13/2017 11:45 AM Mindy Mari MD [facility]        Today's Diagnoses     Posterior auricular lymphadenopathy    -  1    LAD (lymphadenopathy), anterior cervical        Tobacco use disorder           Follow-ups after your visit        Your next 10 appointments already scheduled     Dec 14, 2017  5:30 PM CST   (Arrive by 5:15 PM)   MR SOFT TISSUE NECK W/O & W CONTRAST with SHMRP1   [facility] ([facility])    [address] [place] [address] [phone]           Take your medicines as usual, unless your doctor tells you not to. Bring a list of your current medicines to your exam (including vitamins, minerals and over-the-counter drugs).  You will be given intravenous contrast for this exam. To prepare:   The day before your exam, drink extra fluids at least six 8-ounce glasses (unless your doctor tells you to restrict your fluids).   Have a blood test (creatinine test) within 30 days of your exam. Go to your clinic or Diagnostic Imaging Department for this test.  The MRI machine uses a strong magnet. Please wear clothes without metal (snaps, zippers). A sweatsuit works well, or we may give you a hospital gown.  Please remove any body piercings and hair extensions before you arrive. You will also remove watches, jewelry, hairpins, wallets, dentures, partial dental plates and hearing aids. You may wear contact lenses, and you may be able to wear your rings. We have a safe place to keep your personal items, but it is safer to leave them at home.   **IMPORTANT** THE INSTRUCTIONS BELOW ARE ONLY FOR THOSE PATIENTS WHO HAVE BEEN TOLD THEY WILL RECEIVE SEDATION OR GENERAL ANESTHESIA DURING THEIR MRI PROCEDURE:  IF YOU WILL RECEIVE SEDATION (take  medicine to help you relax during your exam):   You must get the medicine from your doctor before you arrive. Bring the medicine to the exam. Do not take it at home.   Arrive one hour early. Bring someone who can take you home after the test. Your medicine will make you sleepy. After the exam, you may not drive, take a bus or take a taxi by yourself.   No eating 8 hours before your exam. You may have clear liquids up until 4 hours before your exam. (Clear liquids include water, clear tea, black coffee and fruit juice without pulp.)  IF YOU WILL RECEIVE ANESTHESIA (be asleep for your exam):   Arrive 1 1/2 hours early. Bring someone who can take you home after the test. You may not drive, take a bus or take a taxi by yourself.   No eating 8 hours before your exam. You may have clear liquids up until 4 hours before your exam. (Clear liquids include water, clear tea, black coffee and fruit juice without pulp.)  Please call the Imaging Department at your exam site with any questions.              Future tests that were ordered for you today     Open Future Orders        Priority Expected Expires Ordered    MR Soft Tissue Neck w/o & w Contrast Routine  12/13/2018 12/13/2017            Who to contact     If you have questions or need follow up information about today's clinic visit or your schedule please contact [facility] directly at [phone].  Normal or non-critical lab and imaging results will be communicated to you by MyChart, letter or phone within 4 business days after the clinic has received the results. If you do not hear from us within 7 days, please contact the clinic through MyChart or phone. If you have a critical or abnormal lab result, we will notify you by phone as soon as possible.  Submit refill requests through Sakti3 or call your pharmacy and they will forward the refill request to us. Please allow 3 business days for your refill to be completed.          Additional Information  "About Your Visit        Accurate GrouphariSoftStone Information     [employer]EL[employer] lets you send messages to your doctor, view your test results, renew your prescriptions, schedule appointments and more. To sign up, go to [email or web address] . Click on \"Log in\" on the left side of the screen, which will take you to the Welcome page. Then click on \"Sign up Now\" on the right side of the page.     You will be asked to enter the access code listed below, as well as some personal information. Please follow the directions to create your username and password.     Your access code is: [email or web address]  Expires: 2018  5:48 PM     Your access code will  in 90 days. If you need help or a new code, please call your [place] clinic or [phone].        Care EveryWhere ID     This is your Care EveryWhere ID. This could be used by other organizations to access your [place] medical records  [id number]        Your Vitals Were     Pulse Temperature Height BMI (Body Mass Index)          71 97.6  F (36.4  C) (Oral) 5' 10.5\" (1.791 m) 25.18 kg/m2         Blood Pressure from Last 3 Encounters:   17 120/74   10/12/17 127/86   10/08/17 130/90    Weight from Last 3 Encounters:   17 178 lb (80.7 kg)   10/12/17 181 lb (82.1 kg)              We Performed the Following     CBC with platelets differential     Comprehensive metabolic panel (BMP + Alb, Alk Phos, ALT, AST, Total. Bili, TP)     CRP, inflammation     ESR: Erythrocyte sedimentation rate     HIV Antigen Antibody Combo          Today's Medication Changes          These changes are accurate as of: 17 11:59 PM.  If you have any questions, ask your nurse or doctor.               Stop taking these medicines if you haven't already. Please contact your care team if you have questions.     HYDROmorphone 2 MG tablet   Commonly known as:  DILAUDID   Stopped by:  Mindy Mari MD           tiZANidine 4 MG tablet   Commonly known as:  ZANAFLEX   Stopped by:  Mindy Mari, " MD                    Primary Care Provider Office Phone # Fax #    Roberto Javon Pearl PA-C [phone]       No address on file        Equal Access to Services     DULCE MARIA HENRIQUEZ : Hadii aad ku hadlizavidya Poonamhenna, rickyda brodyrio, soleta kasukhjinder vines, yelena merinosukhjinder qureshinikos dugan laJuliettealvino gregorio. So [facility] [phone].    ATENCIÓN: Si habla español, tiene a londono disposición servicios gratuitos de asistencia lingüística. Llame al [phone].    We comply with applicable federal civil rights laws and [place] laws. We do not discriminate on the basis of race, color, national origin, age, disability, sex, sexual orientation, or gender identity.            Thank you!     Thank you for choosing [facility]  for your care. Our goal is always to provide you with excellent care. Hearing back from our patients is one way we can continue to improve our services. Please take a few minutes to complete the written survey that you may receive in the mail after your visit with us. Thank you!             Your Updated Medication List - Protect others around you: Learn how to safely use, store and throw away your medicines at www.disposemymeds.org.      Notice  As of 12/13/2017 11:59 PM    You have not been prescribed any medications.

## 2017-12-13 NOTE — NURSING NOTE
"Chief Complaint   Patient presents with     Lymphedema       Initial /74 (BP Location: Right arm, Patient Position: Chair, Cuff Size: Adult Large)  Pulse 71  Temp 97.6  F (36.4  C) (Oral)  Ht 5' 10.5\" (1.791 m)  Wt 178 lb (80.7 kg)  BMI 25.18 kg/m2 Estimated body mass index is 25.18 kg/(m^2) as calculated from the following:    Height as of this encounter: 5' 10.5\" (1.791 m).    Weight as of this encounter: 178 lb (80.7 kg).  Medication Reconciliation: complete     Health maintenance- tdap needed.    Da Veliz CMA          "

## 2017-12-13 NOTE — PROGRESS NOTES
"  SUBJECTIVE:   Reid Carter is a 37 year old male who presents to clinic today for the following health issues:      Lymphadenopathy    Has had enlarged LN behind right ear for the past 10 months. Has not changes in size. Nontender. GF notes he may have another small one on the same area.     Has new nodes - past 2 weeks behind the left ear as well. Also non tender.     No recent illness.     No fevers.     No sick exposures. No new animals or scratches. No TB exposures. No concern for STD, same sexual partner.     Smoker. No other health conditions of which he is aware.     Feeling well.       Problem list and histories reviewed & adjusted, as indicated.  Additional history: none    Patient Active Problem List   Diagnosis     Degeneration of lumbar or lumbosacral intervertebral disc     History reviewed. No pertinent surgical history.    Social History   Substance Use Topics     Smoking status: Current Every Day Smoker     Smokeless tobacco: Never Used     Alcohol use Yes     History reviewed. No pertinent family history.          Reviewed and updated as needed this visit by clinical staff       Reviewed and updated as needed this visit by Provider         ROS:  Constitutional, HEENT, cardiovascular, pulmonary, gi and gu systems are negative, except as otherwise noted.      OBJECTIVE:   /74 (BP Location: Right arm, Patient Position: Chair, Cuff Size: Adult Large)  Pulse 71  Temp 97.6  F (36.4  C) (Oral)  Ht 5' 10.5\" (1.791 m)  Wt 178 lb (80.7 kg)  BMI 25.18 kg/m2  Body mass index is 25.18 kg/(m^2).  GENERAL: healthy, alert and no distress  EYES: Eyes grossly normal to inspection, PERRL and conjunctivae and sclerae normal  HENT: ear canals and TM's normal, nose and mouth without ulcers or lesions  NECK: freely moveable 1 cm mass right post auricular, non tender anterior chain cervical LAD  RESP: lungs clear to auscultation - no rales, rhonchi or wheezes  CV: regular rate and rhythm, normal S1 S2, no S3 " or S4, no murmur, click or rub, no peripheral edema and peripheral pulses strong  ABDOMEN: soft, nontender, no hepatosplenomegaly, no masses and bowel sounds normal  SKIN: no suspicious lesions or rashes  NEURO: Normal strength and tone, mentation intact and speech normal  PSYCH: mentation appears normal, affect normal/bright  LYMPH: no supraclavicular, axillary, or inguinal adenopathy    Diagnostic Test Results:  none     ASSESSMENT/PLAN:     1. Posterior auricular lymphadenopathy - discussed unusual presentation without any typical exposures. Will investigate with lab work, given persisting areas over 10 month and new areas appearing, will get advanced imaging. Discussed MRI was most helpful as ultrasound may have feedback from mastoid bone  - CBC with platelets differential  - HIV Antigen Antibody Combo  - Comprehensive metabolic panel (BMP + Alb, Alk Phos, ALT, AST, Total. Bili, TP)  - CRP, inflammation  - ESR: Erythrocyte sedimentation rate  - MR Soft Tissue Neck w/o & w Contrast; Future    2. LAD (lymphadenopathy), anterior cervical  - CBC with platelets differential  - HIV Antigen Antibody Combo  - Comprehensive metabolic panel (BMP + Alb, Alk Phos, ALT, AST, Total. Bili, TP)  - CRP, inflammation  - ESR: Erythrocyte sedimentation rate  - MR Soft Tissue Neck w/o & w Contrast; Future    25 minutes spent with patient face-to-face, > 50% in counseling and coordination of care regarding the issues addressed in the assessment and plan.     Mindy Mari MD  [facility]

## 2017-12-14 ENCOUNTER — TELEPHONE (OUTPATIENT)
Dept: FAMILY MEDICINE | Facility: CLINIC | Age: 37
End: 2017-12-14

## 2017-12-14 ENCOUNTER — HOSPITAL ENCOUNTER (OUTPATIENT)
Dept: MRI IMAGING | Facility: CLINIC | Age: 37
Discharge: HOME | End: 2017-12-14
Attending: FAMILY MEDICINE | Admitting: FAMILY MEDICINE
Payer: COMMERCIAL

## 2017-12-14 DIAGNOSIS — R59.0 LAD (LYMPHADENOPATHY), ANTERIOR CERVICAL: ICD-10-CM

## 2017-12-14 DIAGNOSIS — R59.0 POSTERIOR AURICULAR LYMPHADENOPATHY: ICD-10-CM

## 2017-12-14 PROBLEM — F17.200 TOBACCO USE DISORDER: Status: ACTIVE | Noted: 2017-12-14

## 2017-12-14 LAB
ALBUMIN SERPL-MCNC: 4.2 G/DL (ref 3.4–5)
ALP SERPL-CCNC: 67 U/L (ref 40–150)
ALT SERPL W P-5'-P-CCNC: 26 U/L (ref 0–70)
ANION GAP SERPL CALCULATED.3IONS-SCNC: 9 MMOL/L (ref 3–14)
AST SERPL W P-5'-P-CCNC: 24 U/L (ref 0–45)
BILIRUB SERPL-MCNC: 0.3 MG/DL (ref 0.2–1.3)
BUN SERPL-MCNC: 11 MG/DL (ref 7–30)
CALCIUM SERPL-MCNC: 9.9 MG/DL (ref 8.5–10.1)
CHLORIDE SERPL-SCNC: 104 MMOL/L (ref 94–109)
CO2 SERPL-SCNC: 26 MMOL/L (ref 20–32)
CREAT SERPL-MCNC: 0.64 MG/DL (ref 0.66–1.25)
GFR SERPL CREATININE-BSD FRML MDRD: >90 ML/MIN/1.7M2
GLUCOSE SERPL-MCNC: 91 MG/DL (ref 70–99)
HIV 1+2 AB+HIV1 P24 AG SERPL QL IA: NONREACTIVE
POTASSIUM SERPL-SCNC: 4.5 MMOL/L (ref 3.4–5.3)
PROT SERPL-MCNC: 8.2 G/DL (ref 6.8–8.8)
SODIUM SERPL-SCNC: 139 MMOL/L (ref 133–144)

## 2017-12-14 PROCEDURE — 70543 MRI ORBT/FAC/NCK W/O &W/DYE: CPT

## 2017-12-14 PROCEDURE — A9585 GADOBUTROL INJECTION: HCPCS | Performed by: FAMILY MEDICINE

## 2017-12-14 PROCEDURE — 25000128 H RX IP 250 OP 636: Performed by: FAMILY MEDICINE

## 2017-12-14 RX ORDER — GADOBUTROL 604.72 MG/ML
8 INJECTION INTRAVENOUS ONCE
Status: COMPLETED | OUTPATIENT
Start: 2017-12-14 | End: 2017-12-14

## 2017-12-14 RX ADMIN — GADOBUTROL 8 ML: 604.72 INJECTION INTRAVENOUS at 18:27

## 2017-12-14 NOTE — TELEPHONE ENCOUNTER
Left message for patient to return our call.  See message below.  Modesta Kerr RN      Notes Recorded by Mindy Mari MD on 12/14/2017 at 4:12 PM  Please call - lab work was all normal. No signs of infection or lymphoma in the blood work, should get imaging to further investigate enlarged lymph nodes. PENELOPE

## 2020-10-14 ENCOUNTER — OFFICE VISIT (OUTPATIENT)
Dept: FAMILY MEDICINE | Facility: OTHER | Age: 40
End: 2020-10-14
Payer: OTHER GOVERNMENT

## 2020-10-14 ENCOUNTER — NURSE TRIAGE (OUTPATIENT)
Dept: FAMILY MEDICINE | Facility: OTHER | Age: 40
End: 2020-10-14

## 2020-10-14 DIAGNOSIS — Z20.822 COVID-19 RULED OUT: ICD-10-CM

## 2020-10-14 DIAGNOSIS — Z20.822 COVID-19 RULED OUT: Primary | ICD-10-CM

## 2020-10-14 PROCEDURE — U0003 INFECTIOUS AGENT DETECTION BY NUCLEIC ACID (DNA OR RNA); SEVERE ACUTE RESPIRATORY SYNDROME CORONAVIRUS 2 (SARS-COV-2) (CORONAVIRUS DISEASE [COVID-19]), AMPLIFIED PROBE TECHNIQUE, MAKING USE OF HIGH THROUGHPUT TECHNOLOGIES AS DESCRIBED BY CMS-2020-01-R: HCPCS | Performed by: FAMILY MEDICINE

## 2020-10-14 NOTE — TELEPHONE ENCOUNTER
"Patient is scheduled today for covid test.    Reason for Disposition    [1] COVID-19 EXPOSURE (Close Contact) AND [2] within last 14 days BUT [3] NO symptoms    Additional Information    Negative: COVID-19 has been diagnosed by a healthcare provider (HCP)    Negative: COVID-19 lab test positive    Negative: [1] Symptoms of COVID-19 (e.g., cough, fever, SOB, or others) AND [2] lives in an area with community spread    Negative: [1] Symptoms of COVID-19 (e.g., cough, fever, SOB, or others) AND [2] within 14 days of EXPOSURE (close contact) with diagnosed or suspected COVID-19 patient    Negative: [1] Symptoms of COVID-19 (e.g., cough, fever, SOB, or others) AND [2] within 14 days of travel from high-risk area for COVID-19 community spread (identified by CDC)    Negative: [1] Difficulty breathing (shortness of breath) occurs AND [2] onset > 14 days after COVID-19 EXPOSURE (Close Contact) AND [3] no community spread where patient lives    Negative: [1] Dry cough occurs AND [2] onset > 14 days after COVID-19 EXPOSURE AND [3] no community spread where patient lives    Negative: [1] Wet cough (i.e., white-yellow, yellow, green, or tereza colored sputum) AND [2] onset > 14 days after COVID-19 EXPOSURE AND [3] no community spread where patient lives    Negative: [1] Common cold symptoms AND [2] onset > 14 days after COVID-19 EXPOSURE AND [3] no community spread where patient lives    Negative: [1] COVID-19 EXPOSURE (Close Contact) within last 14 days AND [2] needs COVID-19 lab test to return to work AND [3] NO symptoms    Negative: [1] COVID-19 EXPOSURE (Close Contact) within last 14 days AND [2] exposed person is a healthcare worker who was NOT using all recommended personal protective equipment (i.e., a respirator-N95 mask, eye protection, gloves, and gown) AND [3] NO symptoms    Answer Assessment - Initial Assessment Questions  1. CLOSE CONTACT: \"Who is the person with the confirmed or suspected COVID-19 infection that you " "were exposed to?\"      client  2. PLACE of CONTACT: \"Where were you when you were exposed to COVID-19?\" (e.g., home, school, medical waiting room; which city?)      Clients home  3. TYPE of CONTACT: \"How much contact was there?\" (e.g., sitting next to, live in same house, work in same office, same building)      Take care of client  4. DURATION of CONTACT: \"How long were you in contact with the COVID-19 patient?\" (e.g., a few seconds, passed by person, a few minutes, live with the patient)      Half hour. Usually at least 3-6 hours a day  5. DATE of CONTACT: \"When did you have contact with a COVID-19 patient?\" (e.g., how many days ago)      yesterday  6. TRAVEL: \"Have you traveled out of the country recently?\" If so, \"When and where?\"      * Also ask about out-of-state travel, since the [facility] has identified some high-risk cities for community spread in the .      * Note: Travel becomes less relevant if there is widespread community transmission where the patient lives.      No  7. COMMUNITY SPREAD: \"Are there lots of cases of COVID-19 (community spread) where you live?\" (See public health department website, if unsure)        Yes  8. SYMPTOMS: \"Do you have any symptoms?\" (e.g., fever, cough, breathing difficulty)      No  9. PREGNANCY OR POSTPARTUM: \"Is there any chance you are pregnant?\" \"When was your last menstrual period?\" \"Did you deliver in the last 2 weeks?\"      NA  10. HIGH RISK: \"Do you have any heart or lung problems? Do you have a weak immune system?\" (e.g., CHF, COPD, asthma, HIV positive, chemotherapy, renal failure, diabetes mellitus, sickle cell anemia)        No    Protocols used: CORONAVIRUS (COVID-19) EXPOSURE-A- 8.4.20      "

## 2020-10-15 LAB
SARS-COV-2 RNA SPEC QL NAA+PROBE: NOT DETECTED
SPECIMEN SOURCE: NORMAL

## 2020-10-16 ENCOUNTER — TELEPHONE (OUTPATIENT)
Dept: FAMILY MEDICINE | Facility: OTHER | Age: 40
End: 2020-10-16